# Patient Record
Sex: FEMALE | Race: WHITE | NOT HISPANIC OR LATINO | URBAN - METROPOLITAN AREA
[De-identification: names, ages, dates, MRNs, and addresses within clinical notes are randomized per-mention and may not be internally consistent; named-entity substitution may affect disease eponyms.]

---

## 2017-01-27 ENCOUNTER — AMBULATORY - HEALTHEAST (OUTPATIENT)
Dept: LAB | Facility: CLINIC | Age: 37
End: 2017-01-27

## 2017-01-27 DIAGNOSIS — Z00.00 ANNUAL PHYSICAL EXAM: ICD-10-CM

## 2017-01-30 LAB
CHOLEST SERPL-MCNC: 207 MG/DL
FASTING STATUS PATIENT QL REPORTED: NO
HDLC SERPL-MCNC: 55 MG/DL
LDLC SERPL CALC-MCNC: 125 MG/DL
TRIGL SERPL-MCNC: 133 MG/DL

## 2017-02-02 ENCOUNTER — COMMUNICATION - HEALTHEAST (OUTPATIENT)
Dept: MIDWIFE SERVICES | Facility: CLINIC | Age: 37
End: 2017-02-02

## 2017-09-25 ENCOUNTER — COMMUNICATION - HEALTHEAST (OUTPATIENT)
Dept: FAMILY MEDICINE | Facility: CLINIC | Age: 37
End: 2017-09-25

## 2017-09-29 ENCOUNTER — COMMUNICATION - HEALTHEAST (OUTPATIENT)
Dept: FAMILY MEDICINE | Facility: CLINIC | Age: 37
End: 2017-09-29

## 2018-01-10 ENCOUNTER — COMMUNICATION - HEALTHEAST (OUTPATIENT)
Dept: FAMILY MEDICINE | Facility: CLINIC | Age: 38
End: 2018-01-10

## 2018-03-26 ENCOUNTER — OFFICE VISIT - HEALTHEAST (OUTPATIENT)
Dept: FAMILY MEDICINE | Facility: CLINIC | Age: 38
End: 2018-03-26

## 2018-03-26 DIAGNOSIS — F33.0 MAJOR DEPRESSIVE DISORDER, RECURRENT EPISODE, MILD (H): ICD-10-CM

## 2018-03-26 ASSESSMENT — MIFFLIN-ST. JEOR: SCORE: 1486.89

## 2018-04-14 ENCOUNTER — RECORDS - HEALTHEAST (OUTPATIENT)
Dept: ADMINISTRATIVE | Facility: OTHER | Age: 38
End: 2018-04-14

## 2018-05-28 ENCOUNTER — COMMUNICATION - HEALTHEAST (OUTPATIENT)
Dept: FAMILY MEDICINE | Facility: CLINIC | Age: 38
End: 2018-05-28

## 2018-05-29 ENCOUNTER — COMMUNICATION - HEALTHEAST (OUTPATIENT)
Dept: FAMILY MEDICINE | Facility: CLINIC | Age: 38
End: 2018-05-29

## 2018-07-12 ENCOUNTER — RECORDS - HEALTHEAST (OUTPATIENT)
Dept: ADMINISTRATIVE | Facility: OTHER | Age: 38
End: 2018-07-12

## 2018-08-06 ENCOUNTER — COMMUNICATION - HEALTHEAST (OUTPATIENT)
Dept: OBGYN | Facility: CLINIC | Age: 38
End: 2018-08-06

## 2018-08-06 ENCOUNTER — OFFICE VISIT - HEALTHEAST (OUTPATIENT)
Dept: MIDWIFE SERVICES | Facility: CLINIC | Age: 38
End: 2018-08-06

## 2018-08-06 ENCOUNTER — COMMUNICATION - HEALTHEAST (OUTPATIENT)
Dept: MIDWIFE SERVICES | Facility: CLINIC | Age: 38
End: 2018-08-06

## 2018-08-06 DIAGNOSIS — L29.2 VULVAR ITCHING: ICD-10-CM

## 2018-08-06 LAB
CLUE CELLS: NORMAL
TRICHOMONAS, WET PREP: NORMAL
YEAST, WET PREP: NORMAL

## 2018-08-06 ASSESSMENT — MIFFLIN-ST. JEOR: SCORE: 1485.53

## 2019-01-16 ENCOUNTER — COMMUNICATION - HEALTHEAST (OUTPATIENT)
Dept: FAMILY MEDICINE | Facility: CLINIC | Age: 39
End: 2019-01-16

## 2019-02-11 ENCOUNTER — OFFICE VISIT - HEALTHEAST (OUTPATIENT)
Dept: MIDWIFE SERVICES | Facility: CLINIC | Age: 39
End: 2019-02-11

## 2019-02-11 DIAGNOSIS — L29.0 PERIANAL IRRITATION: ICD-10-CM

## 2019-02-11 ASSESSMENT — MIFFLIN-ST. JEOR: SCORE: 1505.49

## 2019-02-19 ENCOUNTER — COMMUNICATION - HEALTHEAST (OUTPATIENT)
Dept: FAMILY MEDICINE | Facility: CLINIC | Age: 39
End: 2019-02-19

## 2019-02-19 ENCOUNTER — AMBULATORY - HEALTHEAST (OUTPATIENT)
Dept: FAMILY MEDICINE | Facility: CLINIC | Age: 39
End: 2019-02-19

## 2019-07-03 ENCOUNTER — COMMUNICATION - HEALTHEAST (OUTPATIENT)
Dept: ADMINISTRATIVE | Facility: CLINIC | Age: 39
End: 2019-07-03

## 2019-07-03 DIAGNOSIS — L29.0 PERIANAL IRRITATION: ICD-10-CM

## 2019-07-15 ENCOUNTER — COMMUNICATION - HEALTHEAST (OUTPATIENT)
Dept: FAMILY MEDICINE | Facility: CLINIC | Age: 39
End: 2019-07-15

## 2019-07-15 DIAGNOSIS — F39 MOOD DISORDER (H): ICD-10-CM

## 2019-08-06 ENCOUNTER — OFFICE VISIT - HEALTHEAST (OUTPATIENT)
Dept: OBGYN | Facility: CLINIC | Age: 39
End: 2019-08-06

## 2019-08-06 DIAGNOSIS — N92.0 MENORRHAGIA WITH REGULAR CYCLE: ICD-10-CM

## 2019-08-06 DIAGNOSIS — L25.9 CONTACT DERMATITIS, UNSPECIFIED CONTACT DERMATITIS TYPE, UNSPECIFIED TRIGGER: ICD-10-CM

## 2019-08-06 ASSESSMENT — MIFFLIN-ST. JEOR: SCORE: 1509.57

## 2019-11-26 ENCOUNTER — COMMUNICATION - HEALTHEAST (OUTPATIENT)
Dept: MIDWIFE SERVICES | Facility: CLINIC | Age: 39
End: 2019-11-26

## 2020-06-01 ENCOUNTER — COMMUNICATION - HEALTHEAST (OUTPATIENT)
Dept: FAMILY MEDICINE | Facility: CLINIC | Age: 40
End: 2020-06-01

## 2020-06-01 DIAGNOSIS — F39 MOOD DISORDER (H): ICD-10-CM

## 2020-06-02 ENCOUNTER — AMBULATORY - HEALTHEAST (OUTPATIENT)
Dept: FAMILY MEDICINE | Facility: CLINIC | Age: 40
End: 2020-06-02

## 2020-06-02 ENCOUNTER — COMMUNICATION - HEALTHEAST (OUTPATIENT)
Dept: FAMILY MEDICINE | Facility: CLINIC | Age: 40
End: 2020-06-02

## 2020-06-02 DIAGNOSIS — Z76.0 ENCOUNTER FOR MEDICATION REFILL: ICD-10-CM

## 2021-05-30 ENCOUNTER — RECORDS - HEALTHEAST (OUTPATIENT)
Dept: ADMINISTRATIVE | Facility: CLINIC | Age: 41
End: 2021-05-30

## 2021-05-30 NOTE — TELEPHONE ENCOUNTER
Name of caller: Patient  Phone number where you may be reached: [phone]  Reason for call: pt called re gyn issue she has seen [facility] twice for, it has still not gone away - she has been dealing with it for over a year and would like a referral to see OBGYN. [facility] had said GYN would be next step.  Pt stated Dr Prescott would be a good choice. Pls let pt know if referral has been placed.  Best time to call back: any  If we don't reach you, is it okay to leave a detailed message? yes

## 2021-05-30 NOTE — TELEPHONE ENCOUNTER
RN cannot approve Refill Request    RN can NOT refill this medication PCP messaged that patient is overdue for Office Visit. Last office visit: 3/26/2018 Red Gould MD Last Physical: Visit date not found Last MTM visit: Visit date not found Last visit same specialty: 3/26/2018 Red Gould MD.  Next visit within 3 mo: Visit date not found  Next physical within 3 mo: Visit date not found      Elizabeth Jackson, [facility] Triage/Med Refill 7/16/2019    Requested Prescriptions   Pending Prescriptions Disp Refills     FLUoxetine (PROZAC) 20 MG tablet [Pharmacy Med Name: FLUOXETINE 20MG TABLETS] 90 tablet 0     Sig: TAKE 1 TABLET BY MOUTH EVERY DAY       [facility] Refill Protocol  Passed - 7/15/2019  3:06 PM        Passed - PCP or prescribing provider visit in last year     Last office visit with prescriber/PCP: 3/26/2018 Red Gould MD OR same dept: Visit date not found OR same specialty: 3/26/2018 Red Gould MD  Last physical: Visit date not found Last MTM visit: Visit date not found   Next visit within 3 mo: Visit date not found  Next physical within 3 mo: Visit date not found  Prescriber OR PCP: Red Gould MD  Last diagnosis associated with med order: There are no diagnoses linked to this encounter.  If protocol passes may refill for 12 months if within 3 months of last provider visit (or a total of 15 months).

## 2021-05-31 ENCOUNTER — RECORDS - HEALTHEAST (OUTPATIENT)
Dept: ADMINISTRATIVE | Facility: CLINIC | Age: 41
End: 2021-05-31

## 2021-05-31 NOTE — PROGRESS NOTES
"CC: I was asked to see Annette Pablo by Jenniffer Richards CNM, secondary to santosh-anal itching.    HPI: The pt is a 39 y.o. MWF  who presents with symptoms since about 16 months ago when she got a hot tub.  At that time she developed a \"rash\" around her anus that has never fully resolved.  She has tried a steroid cream for about 2 weeks.  It helped some but didn't clear her skin completely.  The skin gets irritated and burns when she has a bowel movement.  She got rid of the hot tub in  or Feb of this year, and things got better but wasn't completely resolved.  Then her itching got worse again.  She uses her kids' body wash and whatever laundry detergent is on sale.  She doesn't use fabric softener or dryer sheets.    She also has questions about her periods.  They are every 28 days lasting 3 days and are heavy on the first day.  She changes a tampon every hour.  She also feels like she has some significant hormonal swings over the course of her menstrual cycle with changes in mood and sleep.  She is wondering what options she may have.  Her  has had a vasectomy, so birth control isn't an issue.    Past Medical History:   Diagnosis Date     Allergic      Depression      Varicella        Past Surgical History:   Procedure Laterality Date      SECTION, LOW TRANSVERSE  ,      fractured nose      surgery - anxious after anesthesia     WISDOM TOOTH EXTRACTION Bilateral        Patient's   Family History   Problem Relation Age of Onset     Diabetes type II Paternal Grandmother      Breast cancer Paternal Grandmother      Hypertension Mother         meds.      Depression Father      No Medical Problems Brother      No Medical Problems Maternal Grandmother      Diabetes Maternal Grandfather      Cancer Maternal Grandfather      Diabetes Paternal Grandfather      ADD / ADHD Son      Anxiety disorder Son        Patient   Social History     Socioeconomic History     Marital status:  " "    Spouse name: None     Number of children: None     Years of education: None     Highest education level: None   Occupational History     None   Social Needs     Financial resource strain: None     Food insecurity:     Worry: None     Inability: None     Transportation needs:     Medical: None     Non-medical: None   Tobacco Use     Smoking status: Never Smoker     Smokeless tobacco: Never Used   Substance and Sexual Activity     Alcohol use: Yes     Alcohol/week: 0.0 - 0.6 oz     Drug use: No     Sexual activity: Yes     Partners: Male     Comment: vasectomy   Lifestyle     Physical activity:     Days per week: None     Minutes per session: None     Stress: None   Relationships     Social connections:     Talks on phone: None     Gets together: None     Attends Religion service: None     Active member of club or organization: None     Attends meetings of clubs or organizations: None     Relationship status: None     Intimate partner violence:     Fear of current or ex partner: None     Emotionally abused: None     Physically abused: None     Forced sexual activity: None   Other Topics Concern     None   Social History Narrative     None       Outpatient Medications Prior to Visit   Medication Sig Dispense Refill     cholecalciferol, vitamin D3, 1,000 unit tablet Take 1,000 Units by mouth daily.       FLUoxetine (PROZAC) 20 MG tablet TAKE 1 TABLET BY MOUTH EVERY DAY 90 tablet 3     triamcinolone (KENALOG) 0.025 % ointment Use up to 2 times a day for 2 weeks, if no improvement stop use. After second week lessen to 1 time a day. 30 g 0     No facility-administered medications prior to visit.        Patient is allergic to amoxicillin; cat hair std allergenic ext; claritin [loratadine]; and other environmental allergy.    ROS:  12 part ROS is negative aside from those symptoms in the HPI    PE:  /60   Pulse 68   Ht 5' 5\" (1.651 m)   Wt 186 lb (84.4 kg)   LMP 08/06/2019           Body mass index is 30.95 " kg/m .    General: obese WF, NAD  Perineum: erythematous macules surrounding anal sphincter, more noticeable on the right  Psych: normal mood  Neuro: CN I-XII grossly intact  MS: normal gait    Assessment: 39 y.o. MWF  with skin changes and menstrual changes.    Plan: Natural history of skin issues discussed with the patient.  We discussed using Dove soap and a detergent that is free of dyes and perfumes.  I also recommended a second rinse cycle for the washing machine if that's an option.  I recommended that she try A&D ointment at least twice a day for a week and see what happens.  If it doesn't clear in 1-2 weeks I would recommend a steroid cream again for short term use.    For the periods, we discussed that the changes she is noticing are normal.  We discussed options for both the periods themselves and the other changes she's noting.  We discussed OCPs, Depo Provera, IUDs, Lysteda, and endometrial ablation with pros and cons of each.  At this time she wants to think about her options.  If she decides she would like to try one of them, she will call the CNMs or me.    Questions were answered to the best of my ability.  Approximately 30 minutes were spent with the patient with the majority in counseling.

## 2021-06-01 VITALS — WEIGHT: 181 LBS | HEIGHT: 65 IN | BODY MASS INDEX: 30.16 KG/M2

## 2021-06-01 VITALS — HEIGHT: 65 IN | WEIGHT: 180.7 LBS | BODY MASS INDEX: 30.1 KG/M2

## 2021-06-02 VITALS — HEIGHT: 65 IN | BODY MASS INDEX: 30.84 KG/M2 | WEIGHT: 185.1 LBS

## 2021-06-03 VITALS — BODY MASS INDEX: 30.99 KG/M2 | WEIGHT: 186 LBS | HEIGHT: 65 IN

## 2021-06-08 NOTE — TELEPHONE ENCOUNTER
[facility] team  [phone]  Pool: HE PA MED ([id number])          PA has been initiated.       PA form completed and faxed insurance via Cover My Meds     Key:  [id number]     Medication:  FLUOXETINE 20MG    Insurance:  BCBS        Response will be received via fax and may take up to 5-10 business days depending on plan

## 2021-06-08 NOTE — TELEPHONE ENCOUNTER
Prior Authorization Request  Who s requesting:  Pharmacy  Pharmacy Name and Location: [facility] #[id number]  Medication Name: FLUoxetine (PROZAC) 20 MG tablet   Insurance Plan: [employer] [place]   Insurance Member ID Number:  [id number]  CoverMyMeds Key: N/A  Informed patient that prior authorizations can take up to 10 business days for response:   NA  Okay to leave a detailed message: No

## 2021-06-16 PROBLEM — Z86.59 HISTORY OF DEPRESSION: Status: ACTIVE | Noted: 2018-03-26

## 2021-06-16 NOTE — PROGRESS NOTES
ASSESMENT AND PLAN:  Diagnoses and all orders for this visit:    Major depressive disorder, recurrent episode, mild  Counseling done today with the patient on options.  PHQ 9 done, she scores 1.  Her major depression has resolved and we discussed the risks and benefits of discontinuation of her fluoxetine.  She would like to discontinue the medication, she is going to take a half tablet per day over the next 1 month and then reduce to half tablet every other day for the following 1 month and then discontinue.  She was counseled on maintaining a program of regular exercise, time outside, and healthy stress management.  We reviewed indications for routine and urgent follow-up.  She will continue to take her vitamin D.  If she has recurrent symptoms of depression in the winter she will call or come in right away and we would likely restart fluoxetine.        SUBJECTIVE: 38-year-old female with a history of major depression, please see previous notes for details.  Her last visit with me here in the clinic was about a year and a half ago.  She reports that over that time her depression has been under excellent control.  She is wondering about going off of the medication.  Currently she is not having any problems with depressed mood.  Her motivation is excellent.  She is exercising 4 times per week and balancing her work and life stress well.  Uses occasional alcohol.  She tolerates the fluoxetine well, currently taking 20 mg per day.  She has noticed in the past that her mood has been better during the spring and summer and fall and seems to worsen during the winter.    Past Medical History:   Diagnosis Date     Allergic      Depression      Varicella      Patient Active Problem List   Diagnosis     Lymphadenopathy     History of depression     Current Outpatient Prescriptions   Medication Sig Dispense Refill     FLUoxetine (PROZAC) 20 MG tablet TAKE 1 TABLET BY MOUTH EVERY DAY 90 tablet 0     cholecalciferol, vitamin  "D3, 1,000 unit tablet Take 1,000 Units by mouth daily.       NON FORMULARY Allergy tablet medication (unkown name) and administers one daily PRN       pediatric multivitamin (FLINTSTONES) Chew chewable tablet Once daily.       No current facility-administered medications for this visit.      History   Smoking Status     Never Smoker   Smokeless Tobacco     Never Used       OBJECTICE: /76 (Patient Site: Left Arm, Patient Position: Sitting, Cuff Size: Adult Regular)  Pulse 80  Temp 98.8  F (37.1  C) (Oral)   Resp 16  Ht 5' 5\" (1.651 m)  Wt 181 lb (82.1 kg)  LMP 03/12/2018  Breastfeeding? No  BMI 30.12 kg/m2     No results found for this or any previous visit (from the past 24 hour(s)).     GEN-alert, appropriate, in no apparent distress   CV-regular rate and rhythm with no murmur   RESP-lungs clear to auscultation   Psychiatric-appearance is well-groomed, speech of normal fluency and rate, mood is not depressed, affect is bright, thought content negative for suicidal or homicidal ideation, thought processing negative for paranoid or delusional thinking.  Judgment and insight are intact.      Red Gould          "

## 2021-06-17 NOTE — TELEPHONE ENCOUNTER
Telephone Encounter by Elizabeth Nagy at 6/2/2020 11:44 AM     Author: Elizabeth Nagy Service: -- Author Type: --    Filed: 6/2/2020 11:51 AM Encounter Date: 6/2/2020 Status: Signed    : Elizabeth Nagy       PRIOR AUTHORIZATION DENIED    Denial Rational: Please advise Fluoxetine capsules are preferred over the tablets which were prescribed.     Is provider willing to switch patient to Fluoxetine capsules?  If so pharmacy would just need a new Rx for capsules:  [facility][id number] [address]  [facility]      #[phone]           Appeal Information: Patient needs to initiate the appeals process per insurance plan.  [facility] cannot initiate appeal.  Patient will need to call the number on the back of their insurance card to inquire about initiation process. They will need to sign an authorization form giving permission for the clinic to represent them in the appeal. Once that is completed insurance will reach out to [facility] team if more information is needed.

## 2021-06-19 NOTE — PROGRESS NOTES
"Problem Visit  08/06/18  [id number]  Annette Pablo      Subjective:  Patient presents today with complaint of \"lots of itching\".  Patient reports that the itching absent flows and has been present since at least May, this is the first time this has occurred.  She shares that she notices more discomfort after she wipes which leads to desire to scratch, she has tried to be better about scratching however she has been scratching vulva and perianal area more recently.  She does not notice any bleeding or noting any abnormal purulent discharge and no excoriations.  She states that nothing makes it worse and also did not notice any alleviating factors, she has not tried any sort of cream for the discomfort.  She had no new sexual partners and her sexual partner has a vasectomy.  Denies any vaginal discharge or vaginal pain.  She does shave to remove her pubic hair but has not noticed any change or discomfort.  She notes no new exposures to detergents, clothing, she does not douche, and washes her vulva with water and a light [employer] soap.  No change to bathroom habits, no pain with bowel movements.  Has normal menstruation, uses tampons.  Declines STI screening today.  She is up-to-date with her Pap, her next Pap test and HPV co-test will be due in approximately 10 months.    ROS:   ROS negative except for external vulvar and perianal itching.     Objective:   /80  Pulse 60  Ht 5' 5\" (1.651 m)  Wt 180 lb 11.2 oz (82 kg)  LMP 07/16/2018 (Exact Date)  BMI 30.07 kg/m2  General: Groomed, well nourished, no acute distress.   Pelvic exam: Examination of vulva shows bilateral erythema surrounding labia majora, no plaques or scales noted, no excoriations or exudate, there is erythema and continues posteriorly surrounding the anus.  A few small fissures/excoriations noted, no signs of infection.   No peeling or raised edges noted.  Speculum placed in order to evaluate vaginal tissue and discharge, no " abnormal discharge noted but a wet prep was collected.  Cervix visualized and no signs of infection.  No bimanual examination performed.    Wet Prep: Negative    Assessment:  1.  Differentials include lichen planus, vulvar eczema, vulvar candidiasis.    Plan:  1. Despite negative wet prep, based on patient's symptoms I recommend treatment with Diflucan 1 time.  I also recommend beginning a low-dose steroid.  I recommend the patient uses the steroid 1-2 times a day for 2 weeks, in the third week I recommend that the patient uses the steroid 1 time a day, in the fourth week moving to using the steroid 1 time every 3 days.  I recommend the patient returns to the clinic in 6 months in order for this writer to evaluate his skin integrity as we are currently unsure the exact diagnosis.  If a lesion remains I would recommend vulvar biopsy in order to confirm potential diagnosis.  I also recommend that if no improvement with use of corticosteroids in 2 weeks if she lets this writer now so that I can place a referral to a gynecologist.  We also discussed the benefit of sitz baths and low-dose Benadryl.  Plan for follow up in 6 months or sooner PRN.   Plan for referral to gynecologist for potential biopsy should this persist.      Medications Ordered   Medications     fluconazole (DIFLUCAN) 150 MG tablet     Sig: Take 1 tablet (150 mg total) by mouth once for 1 dose.     Dispense:  1 tablet     Refill:  0     triamcinolone (KENALOG) 0.025 % ointment     Sig: Use up to 2 times a day for 2 weeks, if no improvement stop use. After second week lessen to 1 time a day.     Dispense:  30 g     Refill:  0       ROJAS Pratt, [facility]  Total time: 30 minutes with > 50% spent face to face

## 2021-06-24 NOTE — PROGRESS NOTES
"Annette Pablo   2/11/2019.  1980  [id number]     Assessment:   1. Perianal irritation    Plan:   1. Recommend use of warm water bottle after bowel movement prior to use of toilet paper in order to reduce the amount of wiping needed, recommend a soft toilet paper.   2. Recommend use of barrier cream (such as Desitin).   3. Re-start steroid cream for no more than 2 weeks.   4. Allow body to re-adjust after removal of irritant (hot tub) and if no improvement in 4 weeks let CNM know and we will place referral to OBGYN without need for another in-person evaluation-- shared with patient based on my exam no area that seems would benefit from biopsy, but to let us know if no improvement.     ROJAS Pratt, [facility]  Total time: 15 minutes >50% spent face to face CCC       Subjective:     Annette Pablo presents today for 6 month follow up visit. Annette Pablo was seen 6 months ago for vulvovaginal and perianal itching and started on vulvovaginal candidiasis treatment as well as topical short course steroid. Annette shares that while she still notices some itching she believes that it may be due to her hot tub. She just recently got rid of her hot tub (two weeks ago) and prior to this time shares she uses it on average 2-3 times a week and there are lots of chemicals in it. Since she stopped using, she has noticed a decrease in irritation. She also shares that her first BM of the day is often somewhat uncomfortable. No longer steroid cream and not using any barrier creams. Denies any new exposures and offers no other concerns at this time.     ROS: negative for vaginal bleeding, pain, pain with sex, vaginal discharge, new vulvovaginal or perianal lesions.       Objective:   /68 (Patient Site: Left Arm, Patient Position: Sitting, Cuff Size: Adult Regular)   Pulse 76   Ht 5' 5\" (1.651 m)   Wt 185 lb 1.6 oz (84 kg)   LMP 01/31/2019   Breastfeeding? No   BMI 30.80 " kg/m      Pelvic exam: VULVA: normal appearing vulva with no masses, tenderness or lesions, mild vulvar erythema potentially noted underneath pubic hair, non-tender to touch, not clearly de-markated   Perianal: Minimal perianal erythema noted but much improved, no signs of hemorrhoids.   - A mirror was used to show patient what examiner noted.

## 2021-08-21 ENCOUNTER — HEALTH MAINTENANCE LETTER (OUTPATIENT)
Age: 41
End: 2021-08-21

## 2021-10-16 ENCOUNTER — HEALTH MAINTENANCE LETTER (OUTPATIENT)
Age: 41
End: 2021-10-16

## 2022-09-25 ENCOUNTER — HEALTH MAINTENANCE LETTER (OUTPATIENT)
Age: 42
End: 2022-09-25

## 2024-03-02 ENCOUNTER — HEALTH MAINTENANCE LETTER (OUTPATIENT)
Age: 44
End: 2024-03-02